# Patient Record
Sex: MALE | Race: WHITE | NOT HISPANIC OR LATINO | URBAN - METROPOLITAN AREA
[De-identification: names, ages, dates, MRNs, and addresses within clinical notes are randomized per-mention and may not be internally consistent; named-entity substitution may affect disease eponyms.]

---

## 2018-07-12 ENCOUNTER — EMERGENCY (EMERGENCY)
Facility: HOSPITAL | Age: 33
LOS: 1 days | Discharge: HOME | End: 2018-07-12
Attending: EMERGENCY MEDICINE
Payer: COMMERCIAL

## 2018-07-12 VITALS
HEART RATE: 64 BPM | OXYGEN SATURATION: 97 % | RESPIRATION RATE: 18 BRPM | DIASTOLIC BLOOD PRESSURE: 85 MMHG | SYSTOLIC BLOOD PRESSURE: 142 MMHG | TEMPERATURE: 98 F

## 2018-07-12 VITALS — HEIGHT: 66 IN | WEIGHT: 175.05 LBS

## 2018-07-12 PROCEDURE — 99283 EMERGENCY DEPT VISIT LOW MDM: CPT | Mod: 25

## 2018-07-12 PROCEDURE — 65220 REMOVE FOREIGN BODY FROM EYE: CPT | Mod: RT

## 2018-07-12 PROCEDURE — 90471 IMMUNIZATION ADMIN: CPT

## 2018-07-12 PROCEDURE — 65220 REMOVE FOREIGN BODY FROM EYE: CPT

## 2018-07-12 PROCEDURE — 90715 TDAP VACCINE 7 YRS/> IM: CPT

## 2018-07-12 RX ORDER — TETANUS TOXOID, REDUCED DIPHTHERIA TOXOID AND ACELLULAR PERTUSSIS VACCINE, ADSORBED 5; 2.5; 8; 8; 2.5 [IU]/.5ML; [IU]/.5ML; UG/.5ML; UG/.5ML; UG/.5ML
0.5 SUSPENSION INTRAMUSCULAR ONCE
Qty: 0 | Refills: 0 | Status: COMPLETED | OUTPATIENT
Start: 2018-07-12 | End: 2018-07-12

## 2018-07-12 RX ADMIN — TETANUS TOXOID, REDUCED DIPHTHERIA TOXOID AND ACELLULAR PERTUSSIS VACCINE, ADSORBED 0.5 MILLILITER(S): 5; 2.5; 8; 8; 2.5 SUSPENSION INTRAMUSCULAR at 20:46

## 2018-07-12 NOTE — ED PROVIDER NOTE - OBJECTIVE STATEMENT
33 yo M presented to ED for FB. Pt states that he was doing a lot of different types of yard work today and mid day started feeling fb sensation to eye- Pt states that he was seen at [facility] and was told nothing was in the eye and Rx Ofloxin eye drops. pt decided to come to ED because he "sees something in his eye" Pt does not wear contacts. No visual changes. No photophobia. + pain + redness + tearing

## 2018-07-12 NOTE — ED PROVIDER NOTE - ATTENDING CONTRIBUTION TO CARE
31 y/o Male seen and evaluated with ACP  presents to ED for FB sensation to eye  seen and evaluated for same at [facility]-->cleared  seeking second opinion?  no change invision but has pain, redness and tearing  vitals reviewed, exam as documented  VA, flourescein for abrasion  opth f/u

## 2018-07-12 NOTE — ED PROVIDER NOTE - EYE EXAM METHOD
woods lamp/+ small fb noted embedded in corneal. No hyphema. No iritis(photophobia). EOMI, PERRLA. Lids retracted- no fb noted under lids.

## 2022-12-14 ENCOUNTER — OFFICE (OUTPATIENT)
Dept: URBAN - METROPOLITAN AREA CLINIC 63 | Facility: CLINIC | Age: 37
Setting detail: OPHTHALMOLOGY
End: 2022-12-14
Payer: COMMERCIAL

## 2022-12-14 DIAGNOSIS — S05.02XA: ICD-10-CM

## 2022-12-14 DIAGNOSIS — T15.01XA: ICD-10-CM

## 2022-12-14 PROCEDURE — 65222 REMOVE FOREIGN BODY FROM EYE: CPT | Performed by: STUDENT IN AN ORGANIZED HEALTH CARE EDUCATION/TRAINING PROGRAM

## 2022-12-14 PROCEDURE — 92004 COMPRE OPH EXAM NEW PT 1/>: CPT | Performed by: STUDENT IN AN ORGANIZED HEALTH CARE EDUCATION/TRAINING PROGRAM

## 2022-12-14 ASSESSMENT — KERATOMETRY
OS_AXISANGLE_DEGREES: 090
OD_AXISANGLE_DEGREES: 032
OS_K1POWER_DIOPTERS: 45.25
OS_K2POWER_DIOPTERS: 45.25
OD_K2POWER_DIOPTERS: 46.25
OD_K1POWER_DIOPTERS: 45.50

## 2022-12-14 ASSESSMENT — CONFRONTATIONAL VISUAL FIELD TEST (CVF)
OD_FINDINGS: FULL
OS_FINDINGS: FULL

## 2022-12-14 ASSESSMENT — TONOMETRY
OS_IOP_MMHG: 20
OD_IOP_MMHG: 17

## 2022-12-14 ASSESSMENT — VISUAL ACUITY
OS_BCVA: 20/20-1
OD_BCVA: 20/20-1

## 2022-12-16 ENCOUNTER — OFFICE (OUTPATIENT)
Dept: URBAN - METROPOLITAN AREA CLINIC 104 | Facility: CLINIC | Age: 37
Setting detail: OPHTHALMOLOGY
End: 2022-12-16
Payer: COMMERCIAL

## 2022-12-16 ENCOUNTER — RX ONLY (RX ONLY)
Age: 37
End: 2022-12-16

## 2022-12-16 DIAGNOSIS — T15.01XD: ICD-10-CM

## 2022-12-16 DIAGNOSIS — H57.11: ICD-10-CM

## 2022-12-16 DIAGNOSIS — S05.02XD: ICD-10-CM

## 2022-12-16 PROCEDURE — 65222 REMOVE FOREIGN BODY FROM EYE: CPT | Performed by: OPTOMETRIST

## 2022-12-16 PROCEDURE — 99213 OFFICE O/P EST LOW 20 MIN: CPT | Performed by: OPTOMETRIST

## 2022-12-16 ASSESSMENT — TONOMETRY
OS_IOP_MMHG: 16
OD_IOP_MMHG: 16

## 2022-12-16 ASSESSMENT — REFRACTION_AUTOREFRACTION
OS_AXIS: 082
OD_AXIS: 103
OD_SPHERE: +0.25
OS_CYLINDER: -0.50
OS_SPHERE: 0.00
OD_CYLINDER: -1.00

## 2022-12-16 ASSESSMENT — CONFRONTATIONAL VISUAL FIELD TEST (CVF)
OD_FINDINGS: FULL
OS_FINDINGS: FULL

## 2022-12-16 ASSESSMENT — KERATOMETRY
OD_K2POWER_DIOPTERS: 46.25
OS_K1POWER_DIOPTERS: 45.25
OD_AXISANGLE_DEGREES: 032
OS_AXISANGLE_DEGREES: 090
OS_K2POWER_DIOPTERS: 45.25
OD_K1POWER_DIOPTERS: 45.50

## 2022-12-16 ASSESSMENT — VISUAL ACUITY
OD_BCVA: 20/20-1
OS_BCVA: 20/20-1

## 2022-12-16 ASSESSMENT — CORNEAL TRAUMA - ABRASION: OD_ABRASION: PRESENT

## 2022-12-16 ASSESSMENT — AXIALLENGTH_DERIVED
OS_AL: 23.0588
OD_AL: 22.8416

## 2022-12-16 ASSESSMENT — SPHEQUIV_DERIVED
OS_SPHEQUIV: -0.25
OD_SPHEQUIV: -0.25

## 2022-12-16 ASSESSMENT — CORNEAL TRAUMA - FOREIGN BODY: OD_FOREIGNBODY: PRESENT

## 2023-02-22 ASSESSMENT — SPHEQUIV_DERIVED
OS_SPHEQUIV: -0.25
OD_SPHEQUIV: -0.25

## 2023-02-22 ASSESSMENT — REFRACTION_AUTOREFRACTION
OD_SPHERE: +0.25
OD_CYLINDER: -1.00
OS_CYLINDER: -0.50
OD_AXIS: 103
OS_SPHERE: 0.00
OS_AXIS: 082

## 2023-02-22 ASSESSMENT — CORNEAL TRAUMA - ABRASION: OD_ABRASION: PRESENT

## 2023-02-22 ASSESSMENT — KERATOMETRY
OD_K2POWER_DIOPTERS: 46.25
OS_AXISANGLE_DEGREES: 090
OS_K2POWER_DIOPTERS: 45.25
OS_K1POWER_DIOPTERS: 45.25
OD_AXISANGLE_DEGREES: 032
OD_K1POWER_DIOPTERS: 45.50

## 2023-02-22 ASSESSMENT — CORNEAL TRAUMA - FOREIGN BODY: OD_FOREIGNBODY: METALLIC W/RUST RING PRESENT

## 2023-02-22 ASSESSMENT — AXIALLENGTH_DERIVED
OS_AL: 23.0588
OD_AL: 22.8416

## 2024-06-05 ENCOUNTER — EMERGENCY (EMERGENCY)
Facility: HOSPITAL | Age: 39
LOS: 1 days | Discharge: HOME | End: 2024-06-05
Attending: EMERGENCY MEDICINE
Payer: COMMERCIAL

## 2024-06-05 VITALS
WEIGHT: 203.49 LBS | HEART RATE: 90 BPM | HEIGHT: 65 IN | SYSTOLIC BLOOD PRESSURE: 121 MMHG | DIASTOLIC BLOOD PRESSURE: 78 MMHG | OXYGEN SATURATION: 96 % | TEMPERATURE: 98 F | RESPIRATION RATE: 16 BRPM

## 2024-06-05 PROCEDURE — 99284 EMERGENCY DEPT VISIT MOD MDM: CPT

## 2024-06-05 NOTE — ED PROVIDER NOTE - OBJECTIVE STATEMENT
39 y/o male presenting to the ED for foreign body into the right eye. Hurts when open and blinking with tears, light sensitive. No glasses or contacts. Allergy to Levaquin -joint pain. 37 y/o male presenting to the ED for foreign body into the right eye. Hurts when open and blinking with tears, light sensitive. No glasses or contacts. Patient states he doesn't recall a time when any trauma occurred. He isnt sure if he just brushed something into the eye prior to the saw. Allergy to Levaquin -joint pain.

## 2024-06-05 NOTE — ED ADULT NURSE NOTE - OBJECTIVE STATEMENT
patient with right eye pain and foreign body. patient states that he was cutting stuff in his backyard when a piece of plastic got into his right eye. patient denies vision changes but reports light sensitivity.

## 2024-06-05 NOTE — ED PROVIDER NOTE - NS ED ROS FT
Gen: denies fever, chills  HEENT: (+) visual changes, no ear pain, nasal congestion, throat pain  Respiratory: denies TOLENTINO, SOB, cough, wheezing  Cardiovascular: denies chest pain, palpitations  MSK: denies joint swelling/pain, back pain, neck pain  Neuro: denies headache, dizziness, weakness, numbness

## 2024-06-05 NOTE — ED ADULT NURSE NOTE - NSFALLLASTSIX_ED_ALL_ED
nonweightbearing bilateral lower extremities.  pain control.  antibiotics for 1 week.  baby aspirin for DVT prophylaxis.  follow up in 2 weeks for postop visit.
Unable to determine.

## 2024-06-05 NOTE — ED ADULT NURSE NOTE - HISTORY OF COVID-19 VACCINATION
That he not suffer any more, than he has over last year.    Treat pain, provide comfort Vaccine status unknown

## 2024-06-05 NOTE — ED PROVIDER NOTE - CLINICAL SUMMARY MEDICAL DECISION MAKING FREE TEXT BOX
39 y/o male presenting to the ED for foreign body into the right eye. Hurts when open and blinking with tears, light sensitive. Denies high power injury/traumato the eye that he can remember.   Fluorescin revealing small corneal abrasion. Sight MD follow up recommended. Pt re-assessed at this time, feeling well, noting improvement in symptoms. VSS, tolerating PO intake, ambulating in ED with steady gait. All results reviewed with pt, all questions answered. Pt provided copy of all results. Return precautions given. Stable for dc.

## 2024-06-05 NOTE — ED PROVIDER NOTE - PATIENT PORTAL LINK FT
You can access the FollowMyHealth Patient Portal offered by [facility] by registering at the following website: [email or web address]. By joining [facility]’s FollowMyHealth portal, you will also be able to view your health information using other applications (apps) compatible with our system.

## 2024-06-05 NOTE — ED PROVIDER NOTE - PHYSICAL EXAMINATION
Gen: No acute distress, non toxic male, speaking in full sentences   HEENT: (+)NC Mucous membranes moist, Oropharynx without exudates, uvula midline  Eye: (+) Left eye normal appearing eyelid. pink conjunctivae, EOMI, PERRL. Right eye normal appearing  CV: RRR, nl s1/s2 noted   Fluorescin stain: (+) Small corneal abrasion noted to below pupil. No dolores sign noted   Resp: CTAB, normal rate and effort  GI: Abdomen soft, NT, ND. No rebound, no guarding  Neuro: A&O x 3, sensorimotor intact without deficits   MSK: No spine or joint tenderness to palpation, Full ROM ext x 4  Skin: No rashes. intact and perfused  Ambulatory in the ED

## 2024-06-05 NOTE — ED PROVIDER NOTE - ATTENDING APP SHARED VISIT CONTRIBUTION OF CARE
37 y/o male presenting to the ED for foreign body into the right eye. Hurts when open and blinking with tears, light sensitive. Denies high power injury/traumato the eye that he can remember.   Fluorescin revealing small corneal abrasion. Sight MD follow up recommended. Pt re-assessed at this time, feeling well, noting improvement in symptoms. VSS, tolerating PO intake, ambulating in ED with steady gait. All results reviewed with pt, all questions answered. Pt provided copy of all results. Return precautions given. Stable for dc

## 2024-06-05 NOTE — ED PROVIDER NOTE - NSFOLLOWUPINSTRUCTIONS_ED_ALL_ED_FT
Corneal Abrasion    The cornea is the clear covering at the front and center of the eye. This very thin tissue is made up of many layers. If a scratch or injury causes the corneal epithelium to come off, it is called a corneal abrasion. Symptoms include eye pain, redness, tearing, difficulty keeping eye open, and light sensitivity. Do not drive or operate machinery if your eye is patched.  Antibiotic eye drops may be prescribed to reduce the risk of infection.  It is important to follow up with an ophthalmologist (eye doctor) to ensure proper healing.    SEEK IMMEDIATE MEDICAL CARE IF YOU HAVE ANY OF THE FOLLOWING SYMPTOMS: discharge from eyes, changes in vision, fever, or swelling.    Please follow up with Eye Doctor within 24 hours   Please use drops as needed    SIGHT MD   [facility] [address] [place] [id number]  [phone]

## 2024-06-06 ENCOUNTER — RX ONLY (RX ONLY)
Age: 39
End: 2024-06-06

## 2024-06-06 ENCOUNTER — OFFICE (OUTPATIENT)
Dept: URBAN - METROPOLITAN AREA CLINIC 63 | Facility: CLINIC | Age: 39
Setting detail: OPHTHALMOLOGY
End: 2024-06-06
Payer: COMMERCIAL

## 2024-06-06 DIAGNOSIS — T15.01XA: ICD-10-CM

## 2024-06-06 PROBLEM — H57.11 PAIN IN/OR AROUND EYES; RIGHT EYE: Status: ACTIVE | Noted: 2024-06-06

## 2024-06-06 PROBLEM — S05.02XA CORNEAL ABRASION; INITIAL ENCOUNTER: Status: ACTIVE | Noted: 2024-06-06

## 2024-06-06 PROCEDURE — 99283 EMERGENCY DEPT VISIT LOW MDM: CPT

## 2024-06-06 PROCEDURE — 65222 REMOVE FOREIGN BODY FROM EYE: CPT | Mod: RT | Performed by: STUDENT IN AN ORGANIZED HEALTH CARE EDUCATION/TRAINING PROGRAM

## 2024-06-06 RX ORDER — POLYMYXIN B SULF/TRIMETHOPRIM 10000-1/ML
1 DROPS OPHTHALMIC (EYE) ONCE
Refills: 0 | Status: COMPLETED | OUTPATIENT
Start: 2024-06-06 | End: 2024-06-06

## 2024-06-06 RX ORDER — ERYTHROMYCIN BASE 5 MG/GRAM
1 OINTMENT (GRAM) OPHTHALMIC (EYE) ONCE
Refills: 0 | Status: COMPLETED | OUTPATIENT
Start: 2024-06-06 | End: 2024-06-06

## 2024-06-06 RX ADMIN — Medication 1 DROP(S): at 00:38

## 2024-06-06 RX ADMIN — Medication 1 APPLICATION(S): at 00:38

## 2024-06-06 ASSESSMENT — CONFRONTATIONAL VISUAL FIELD TEST (CVF)
OS_FINDINGS: FULL
OD_FINDINGS: FULL

## 2024-07-03 ENCOUNTER — OFFICE (OUTPATIENT)
Dept: URBAN - METROPOLITAN AREA CLINIC 110 | Facility: CLINIC | Age: 39
Setting detail: OPHTHALMOLOGY
End: 2024-07-03
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
## 2024-07-03 PROCEDURE — NO SHOW FE NO SHOW FEE: Performed by: STUDENT IN AN ORGANIZED HEALTH CARE EDUCATION/TRAINING PROGRAM

## 2025-01-11 ENCOUNTER — OFFICE (OUTPATIENT)
Dept: URBAN - METROPOLITAN AREA CLINIC 63 | Facility: CLINIC | Age: 40
Setting detail: OPHTHALMOLOGY
End: 2025-01-11
Payer: COMMERCIAL

## 2025-01-11 DIAGNOSIS — T15.02XA: ICD-10-CM

## 2025-01-11 PROBLEM — S05.02X: Status: ACTIVE | Noted: 2025-01-11

## 2025-01-11 PROCEDURE — 65222 REMOVE FOREIGN BODY FROM EYE: CPT | Mod: LT | Performed by: STUDENT IN AN ORGANIZED HEALTH CARE EDUCATION/TRAINING PROGRAM

## 2025-01-11 PROCEDURE — 92012 INTRM OPH EXAM EST PATIENT: CPT | Performed by: STUDENT IN AN ORGANIZED HEALTH CARE EDUCATION/TRAINING PROGRAM

## 2025-01-11 ASSESSMENT — VISUAL ACUITY
OS_BCVA: 20/20
OD_BCVA: 20/30

## 2025-01-11 ASSESSMENT — REFRACTION_AUTOREFRACTION
OS_AXIS: 125
OD_AXIS: 097
OD_SPHERE: +1.25
OS_SPHERE: +0.75
OD_CYLINDER: -1.00
OS_CYLINDER: -0.25

## 2025-01-11 ASSESSMENT — CORNEAL TRAUMA - ABRASION: OS_ABRASION: PRESENT

## 2025-01-11 ASSESSMENT — KERATOMETRY
OD_AXISANGLE_DEGREES: 022
OS_K2POWER_DIOPTERS: 45.75
OD_K2POWER_DIOPTERS: 44.50
OS_K1POWER_DIOPTERS: 45.00
OS_AXISANGLE_DEGREES: 101
OD_K1POWER_DIOPTERS: 44.00

## 2025-01-11 ASSESSMENT — CORNEAL TRAUMA - FOREIGN BODY: OS_FOREIGNBODY: METALLIC W/RUST RING PRESENT

## 2025-01-11 ASSESSMENT — CONFRONTATIONAL VISUAL FIELD TEST (CVF)
OD_FINDINGS: FULL
OS_FINDINGS: FULL

## 2025-02-10 ENCOUNTER — OFFICE (OUTPATIENT)
Dept: URBAN - METROPOLITAN AREA CLINIC 63 | Facility: CLINIC | Age: 40
Setting detail: OPHTHALMOLOGY
End: 2025-02-10
Payer: COMMERCIAL

## 2025-02-10 DIAGNOSIS — T15.02XD: ICD-10-CM

## 2025-02-10 PROCEDURE — 92014 COMPRE OPH EXAM EST PT 1/>: CPT | Performed by: INTERNAL MEDICINE

## 2025-02-10 ASSESSMENT — REFRACTION_AUTOREFRACTION
OS_CYLINDER: -0.25
OS_SPHERE: +0.75
OS_AXIS: 125
OD_CYLINDER: -1.00
OD_AXIS: 097
OD_SPHERE: +1.25

## 2025-02-10 ASSESSMENT — CORNEAL SURGICAL SCARRING
OD_SCARRING: CENTRAL ANTERIOR STROMAL
OS_SCARRING: MID PERIPHERAL ANTERIOR STROMAL

## 2025-02-10 ASSESSMENT — KERATOMETRY
OD_K2POWER_DIOPTERS: 44.50
OS_K1POWER_DIOPTERS: 45.00
OD_K1POWER_DIOPTERS: 44.00
OS_K2POWER_DIOPTERS: 45.75
OS_AXISANGLE_DEGREES: 101
OD_AXISANGLE_DEGREES: 022

## 2025-02-10 ASSESSMENT — CONFRONTATIONAL VISUAL FIELD TEST (CVF)
OS_FINDINGS: FULL
OD_FINDINGS: FULL

## 2025-02-10 ASSESSMENT — VISUAL ACUITY
OS_BCVA: 20/25-1
OD_BCVA: 20/20-1

## 2025-02-10 ASSESSMENT — TONOMETRY
OS_IOP_MMHG: 17
OD_IOP_MMHG: 16